# Patient Record
Sex: MALE | Race: BLACK OR AFRICAN AMERICAN | Employment: FULL TIME | URBAN - METROPOLITAN AREA
[De-identification: names, ages, dates, MRNs, and addresses within clinical notes are randomized per-mention and may not be internally consistent; named-entity substitution may affect disease eponyms.]

---

## 2020-07-07 ENCOUNTER — VIRTUAL VISIT (OUTPATIENT)
Dept: ENDOCRINOLOGY | Age: 35
End: 2020-07-07
Payer: COMMERCIAL

## 2020-07-07 PROCEDURE — 99203 OFFICE O/P NEW LOW 30 MIN: CPT | Performed by: INTERNAL MEDICINE

## 2020-07-07 RX ORDER — PEN NEEDLE, DIABETIC 31 GX5/16"
NEEDLE, DISPOSABLE MISCELLANEOUS
Qty: 150 EACH | Refills: 6 | Status: SHIPPED | OUTPATIENT
Start: 2020-07-07 | End: 2021-09-16

## 2020-07-07 RX ORDER — INSULIN DETEMIR 100 [IU]/ML
INJECTION, SOLUTION SUBCUTANEOUS
COMMUNITY
Start: 2020-05-15 | End: 2021-04-29 | Stop reason: SDUPTHER

## 2020-07-07 RX ORDER — INSULIN DETEMIR 100 [IU]/ML
INJECTION, SOLUTION SUBCUTANEOUS
Qty: 5 PEN | Refills: 1 | Status: SHIPPED | OUTPATIENT
Start: 2020-07-07 | End: 2021-02-01

## 2020-07-07 RX ORDER — INSULIN ASPART 100 [IU]/ML
INJECTION, SOLUTION INTRAVENOUS; SUBCUTANEOUS
Qty: 5 PEN | Refills: 3 | Status: SHIPPED | OUTPATIENT
Start: 2020-07-07 | End: 2021-09-16

## 2020-07-07 NOTE — PROGRESS NOTES
Seen as new patient for diabetes      Pursuant to the emergency declaration under the [facility], 1135 waiver authority and the [employer] Act, this Virtual  Visit was conducted, with patient's consent, to reduce the patient's risk of exposure to COVID-19 and provide continuity of care for an established patient. Patient was at home. Provider was at home. No one else was involved  Services were provided through a video synchronous discussion virtually to substitute for in-person clinic visit. Diagnosed with Type 1 diabetes mellitus 2017  Known diabetic complications: none   Uncontrolled, moderate    Glucose 622 on admission  Mild DKA per records/labs    Records reviewed    He was being seen at [facility], not adherent with visit  Now insurance changed    Current diabetic medications     Ran out of levemir insulin for a month    levemir  15 units  Humalog 8 units AC TID- last took 3 months ago    Last A1c   8.3%    Prior visit with dietician:no   Current diet: on average, 2 meals per day   Current exercise: walking   Current monitoring regimen: home blood tests -0 times daily     Has brought blood glucose log/meter: No   Home blood sugar records: -----  Any episodes of hypoglycemia? -----  Worsened by high CHO    No Hx of CAD , PVD, CVA     on 2/19      Last eye exam:   Last foot exam:   Last microalbumin to creatinine ratio:2/19 1/17  C-peptide 0.3  VAUGHN Ab 5.2   ICA -ve    He has been on insulin but now not taking    SH: No smoking, works at [employer]    [facility]: Type 1 and 2 DM in family    PMH:  T1DM    PSH:  None    ROS:    Constitutional: Negative for weight loss and malaise/fatigue. Negative for fever and chills. HENT: Negative for hearing loss, ear pain, nosebleeds, neck pain and tinnitus. Eyes: Negative for blurred vision. Negative for double vision, photophobia and pain.    Respiratory: Negative for cough and sputum production. Cardiovascular: Negative for chest pain, palpitations and leg swelling. Gastrointestinal: Negative for nausea, vomiting and abdominal pain. Genitourinary: Negative for dysuria, urgency and frequency. Musculoskeletal: Negative for back pain. No joint pain  Skin: Negative for itching and rash. Neurological: Negative for dizziness. Negative for tingling, tremors, focal weakness and headaches. Endo/Heme/Allergies: see HPI  Psychiatric/Behavioral: Negative for depression and substance abuse. PHYSICAL EXAMINATION:  [ INSTRUCTIONS:  \"[x]\" Indicates a positive item  \"[]\" Indicates a negative item  -- DELETE ALL ITEMS NOT EXAMINED]  Vital Signs: (As obtained by patient/caregiver or practitioner observation)    Blood pressure-  Heart rate-    Respiratory rate- 14   Temperature-  Pulse oximetry-     Constitutional Appears well-developed and well-nourished No apparent distress        Mental status  Alert and awake  Oriented to person/place/time  Able to follow commands      Eyes:  EOM    [x]  Normal    Sclera  [x]  Normal           Discharge [x]  None visible      HENT:   [x] Normocephalic, atraumatic.     [x] Mouth/Throat: Mucous membranes are moist.     External Ears [x] Normal  no discharge    Neck: [x] No visualized mass  no swelling    Pulmonary/Chest: [x] Respiratory effort normal.  [x] No visualized signs of difficulty breathing or respiratory distress             Musculoskeletal:   [x] Normal gait with no signs of ataxia         [x] Normal range of motion of neck          Head and neck stable, appears normal ROM, strength good    Neurological:        [x] No Facial Asymmetry (Cranial nerve 7 motor function) (limited exam to video visit)          [x] No gaze palsy                Skin:        [x] No significant exanthematous lesions or discoloration noted on facial skin                 Psychiatric:       [x] Normal Affect [x] No Hallucinations            Other pertinent observable

## 2021-04-28 ENCOUNTER — HOSPITAL ENCOUNTER (EMERGENCY)
Age: 36
Discharge: HOME | End: 2021-04-29
Attending: EMERGENCY MEDICINE
Payer: COMMERCIAL

## 2021-04-28 VITALS
BODY MASS INDEX: 27.68 KG/M2 | RESPIRATION RATE: 12 BRPM | DIASTOLIC BLOOD PRESSURE: 90 MMHG | OXYGEN SATURATION: 96 % | SYSTOLIC BLOOD PRESSURE: 125 MMHG | HEART RATE: 83 BPM | WEIGHT: 146.61 LBS | TEMPERATURE: 98.2 F | HEIGHT: 61 IN

## 2021-04-28 DIAGNOSIS — N48.1 BALANITIS: Primary | ICD-10-CM

## 2021-04-28 PROCEDURE — 87591 N.GONORRHOEAE DNA AMP PROB: CPT

## 2021-04-28 PROCEDURE — 99282 EMERGENCY DEPT VISIT SF MDM: CPT

## 2021-04-28 PROCEDURE — 87491 CHLMYD TRACH DNA AMP PROBE: CPT

## 2021-04-28 PROCEDURE — 81015 MICROSCOPIC EXAM OF URINE: CPT

## 2021-04-29 LAB
C. TRACHOMATIS DNA ,URINE: NEGATIVE
N. GONORRHOEAE DNA, URINE: NEGATIVE
URINE TRICHOMONAS EVALUATION: NORMAL

## 2021-04-29 RX ORDER — CLOTRIMAZOLE 1 %
CREAM (GRAM) TOPICAL
Qty: 1 TUBE | Refills: 1 | Status: SHIPPED | OUTPATIENT
Start: 2021-04-29 | End: 2021-05-06

## 2021-04-29 NOTE — ED PROVIDER NOTES
[address] ENCOUNTER      Pt Name: Yamel Cazares  MRN: [id number]  [place] 1985  Date of evaluation: 4/28/2021  Provider: Sudha Jenkins MD    CHIEF COMPLAINT       Chief Complaint   Patient presents with    Penile Discharge     with itching x3days         HISTORY OF PRESENT ILLNESS   (Location/Symptom, Timing/Onset,Context/Setting, Quality, Duration, Modifying Factors, Severity)  Note limiting factors. Yamel Cazares is a 38 y.o. male who presents to the emergency department for penile discharge and itching. The patient said it started about 3 days ago after having intercourse. He was wearing a condom. He has been having similar issues for about 4 years. It was associated with his diagnosis of diabetes. He said that he has been tested for STDs in the past and it was negative. He has not followed up. He is uncircumcised. The drainage is he describes it not from the urethra but beneath the foreskin. Nursing notes were reviewed. REVIEW OF SYSTEMS    (2-9 systems for level 4, 10 or more for level 5)     Review of Systems      PAST MEDICAL HISTORY     Past Medical History:   Diagnosis Date    Influenza A 12/22/2014         SURGICALHISTORY     No past surgical history on file. CURRENT MEDICATIONS       Discharge Medication List as of 4/29/2021 12:31 AM      CONTINUE these medications which have NOT CHANGED    Details   !! insulin detemir (LEVEMIR FLEXTOUCH) 100 UNIT/ML injection pen INJECT 15 UNITS DAILY, Disp-5 pen, R-0Needs to schedule f/uNormal      !!  LEVEMIR FLEXTOUCH 100 UNIT/ML injection pen INJECT 15 UNITS SUBCUTANEOUSLY AT BEDTIME FOR 30 DAYS., DAWHistorical Med      insulin aspart (NOVOLOG FLEXPEN) 100 UNIT/ML injection pen 8 units AC TID, Disp-5 pen, R-3Normal      Insulin Pen Needle (B-D UF III MINI PEN NEEDLES) 31G X 5 MM MISC Disp-150 each, R-6, Normal4 times a day      ibuprofen (ADVIL;MOTRIN) 200 MG tablet Take 600 mg by mouth every 6 hours as needed for Pain. !! - Potential duplicate medications found. Please discuss with provider. ALLERGIES     Patient has no known allergies. FAMILY HISTORY     No family history on file. SOCIAL HISTORY       Social History     Socioeconomic History    Marital status: Single     Spouse name: Not on file    Number of children: Not on file    Years of education: Not on file    Highest education level: Not on file   Occupational History    Not on file   Social Needs    Financial resource strain: Not on file    Food insecurity     Worry: Not on file     Inability: Not on file    Transportation needs     Medical: Not on file     Non-medical: Not on file   Tobacco Use    Smoking status: Never Smoker   Substance and Sexual Activity    Alcohol use: Yes    Drug use: Not on file    Sexual activity: Not on file   Lifestyle    Physical activity     Days per week: Not on file     Minutes per session: Not on file    Stress: Not on file   Relationships    Social connections     Talks on phone: Not on file     Gets together: Not on file     Attends [employer] service: Not on file     Active member of club or organization: Not on file     Attends meetings of clubs or organizations: Not on file     Relationship status: Not on file    Intimate partner violence     Fear of current or ex partner: Not on file     Emotionally abused: Not on file     Physically abused: Not on file     Forced sexual activity: Not on file   Other Topics Concern    Not on file   Social History Narrative    Not on file       SCREENINGS             PHYSICAL EXAM    (up to 7 for level 4, 8 or more for level 5)     ED Triage Vitals [04/28/21 2308]   BP Temp Temp Source Pulse Resp SpO2 Height Weight   (!) 125/90 98.2 °F (36.8 °C) Oral 83 12 96 % 5' 1\" (1.549 m) 146 lb 9.7 oz (66.5 kg)       Physical Exam  Vitals signs and nursing note reviewed. Constitutional:       Appearance: Normal appearance. He is well-developed. He is not ill-appearing. HENT:      Head: Normocephalic and atraumatic. Right Ear: External ear normal.      Left Ear: External ear normal.      Nose: Nose normal.   Eyes:      General: No scleral icterus. Right eye: No discharge. Left eye: No discharge. Conjunctiva/sclera: Conjunctivae normal.   Neck:      Musculoskeletal: Neck supple. Pulmonary:      Effort: Pulmonary effort is normal. No respiratory distress. Genitourinary:     Comments: No inguinal adenopathy or tenderness. No penile tenderness. He does have an uncircumcised penis. No urethral discharge however there is copious amount of creamish discharge beneath the foreskin  Skin:     Coloration: Skin is not pale. Neurological:      Mental Status: He is alert. Psychiatric:         Mood and Affect: Mood normal.         Behavior: Behavior normal.             DIAGNOSTIC RESULTS     EKG: All EKG's are interpreted by the Emergency Department Physician who either signs or Co-signs this chart in the absence of a cardiologist.    12 lead EKG shows     RADIOLOGY:   Non-plain film images such as CT, Ultrasound and MRI are read by the radiologist. Plain radiographic images are visualized and preliminarily interpreted by the emergency physician with the below findings:        Interpretation per the Radiologist below, if available at the time of this note:    No orders to display         ED BEDSIDE ULTRASOUND:   Performed by ED Physician - none    LABS:  Labs Reviewed   [address] DNA, URINE   URINE TRICHOMONAS EVALUATION    Narrative:     Performed at:  [facility]) - [facility]  [address] [facility]   Phone [phone]       All other labs were within normal range or not returned as of this dictation.     EMERGENCY DEPARTMENT COURSE and DIFFERENTIAL DIAGNOSIS/MDM:   Vitals:    Vitals:    04/28/21 2308   BP: (!) 125/90   Pulse: 83   Resp: 12 Temp: 98.2 °F (36.8 °C)   TempSrc: Oral   SpO2: 96%   Weight: 146 lb 9.7 oz (66.5 kg)   Height: 5' 1\" (1.549 m)       Adult male with what appears to be balanitis. Could be related to irritation from condom use as he said is usually associated with this but he is diabetic and an increased risk of yeast.  A KOH was ordered there were epithelial cells bacterial cells and no identified the yeast cells however given the patient's history I will prescribe antifungal.  I recommend close follow-up with a urologist.  His blood sugars have been controlled with medications. CRITICAL CARE TIME   None       CONSULTS:  None    PROCEDURES:       Procedures    FINAL IMPRESSION      1.  Balanitis          DISPOSITION/PLAN   DISPOSITION Decision To Discharge 04/29/2021 12:20:29 AM      PATIENT REFERREDTO:  Ifeoma Krishna, [address] [place] [address] [address] [place] [address]  [phone]            DISCHARGEMEDICATIONS:  Discharge Medication List as of 4/29/2021 12:31 AM      START taking these medications    Details   clotrimazole (LOTRIMIN) 1 % cream Apply topically 3 times daily to affected area, Disp-1 Tube, R-1, Normal                (Please note that portions of this note were completed with a voice recognition program.  Efforts were made to edit the dictations but occasionally words are mis-transcribed.)    Donis Zuleta MD (electronically signed)  Attending Emergency Physician        Donis Zuleta MD  04/29/21 [id number]

## 2021-09-09 RX ORDER — INSULIN ASPART 100 [IU]/ML
INJECTION, SOLUTION INTRAVENOUS; SUBCUTANEOUS
Qty: 5 PEN | Refills: 3 | OUTPATIENT
Start: 2021-09-09

## 2021-09-09 NOTE — TELEPHONE ENCOUNTER
Medication:   Requested Prescriptions     Pending Prescriptions Disp Refills    insulin aspart (NOVOLOG FLEXPEN) 100 UNIT/ML injection pen [Pharmacy Med Name: Josr Morocho 100 UNIT/ML FLEXPEN] 5 pen 3     Si UNITS THREE TIMES DAILY BEFORE MEALS       Last Filled:      Patient Phone Number: [phone] (home)     Last appt: 2020   Next appt: Visit date not found    Last Labs DM: No results found for: Christopher Thomas

## 2021-09-16 ENCOUNTER — HOSPITAL ENCOUNTER (EMERGENCY)
Age: 36
Discharge: HOME | End: 2021-09-16
Attending: EMERGENCY MEDICINE
Payer: COMMERCIAL

## 2021-09-16 VITALS
TEMPERATURE: 97.8 F | HEART RATE: 93 BPM | OXYGEN SATURATION: 97 % | WEIGHT: 143.08 LBS | RESPIRATION RATE: 18 BRPM | BODY MASS INDEX: 27.03 KG/M2 | DIASTOLIC BLOOD PRESSURE: 85 MMHG | SYSTOLIC BLOOD PRESSURE: 133 MMHG

## 2021-09-16 DIAGNOSIS — R73.9 HYPERGLYCEMIA: ICD-10-CM

## 2021-09-16 DIAGNOSIS — Z76.0 ENCOUNTER FOR MEDICATION REFILL: Primary | ICD-10-CM

## 2021-09-16 DIAGNOSIS — Z79.4 INSULIN LONG-TERM USE (HCC): ICD-10-CM

## 2021-09-16 DIAGNOSIS — E13.9 LADA (LATENT AUTOIMMUNE DIABETES IN ADULTS), MANAGED AS TYPE 1 (HCC): Primary | ICD-10-CM

## 2021-09-16 DIAGNOSIS — E11.9 DIABETES MELLITUS TYPE 2 IN NONOBESE (HCC): ICD-10-CM

## 2021-09-16 PROCEDURE — 99283 EMERGENCY DEPT VISIT LOW MDM: CPT

## 2021-09-16 RX ORDER — INSULIN ASPART 100 [IU]/ML
INJECTION, SOLUTION INTRAVENOUS; SUBCUTANEOUS
Qty: 5 PEN | Refills: 3 | Status: SHIPPED | OUTPATIENT
Start: 2021-09-16 | End: 2022-04-14 | Stop reason: SDUPTHER

## 2021-09-16 RX ORDER — INSULIN DETEMIR 100 [IU]/ML
INJECTION, SOLUTION SUBCUTANEOUS
Qty: 5 PEN | Refills: 3 | Status: SHIPPED | OUTPATIENT
Start: 2021-09-16 | End: 2022-04-21 | Stop reason: SDUPTHER

## 2021-09-16 RX ORDER — PEN NEEDLE, DIABETIC 31 GX5/16"
NEEDLE, DISPOSABLE MISCELLANEOUS
Qty: 100 EACH | Refills: 10 | Status: SHIPPED | OUTPATIENT
Start: 2021-09-16

## 2021-09-16 ASSESSMENT — ENCOUNTER SYMPTOMS
GASTROINTESTINAL NEGATIVE: 1
EYES NEGATIVE: 1
COUGH: 0

## 2021-09-16 NOTE — TELEPHONE ENCOUNTER
Medication:   Requested Prescriptions     Pending Prescriptions Disp Refills    insulin aspart (NOVOLOG FLEXPEN) 100 UNIT/ML injection pen [Pharmacy Med Name: Aimee Land 100 UNIT/ML FLEXPEN] 5 pen 3     Si UNITS THREE TIMES DAILY BEFORE MEALS    Insulin Pen Needle (B-D UF III MINI PEN NEEDLES) 31G X 5 MM MISC [Pharmacy Med Name: BD UF MINI PEN NEEDLE [id number]] 100 each 10     Sig: USE 4 TIMES A DAY         Last appt: 2020   Next appt: 2021

## 2021-09-16 NOTE — ED PROVIDER NOTES
file.  CURRENT MEDICATIONS       Current Discharge Medication List      CONTINUE these medications which have NOT CHANGED    Details   insulin aspart (NOVOLOG FLEXPEN) 100 UNIT/ML injection pen 8 units AC TID  Qty: 5 pen, Refills: 3      Insulin Pen Needle (B-D UF III MINI PEN NEEDLES) 31G X 5 MM MISC 4 times a day  Qty: 150 each, Refills: 6      ibuprofen (ADVIL;MOTRIN) 200 MG tablet Take 600 mg by mouth every 6 hours as needed for Pain. ALLERGIES     Patient has no known allergies. FAMILY HISTORY     No family history on file. SOCIAL HISTORY       Social History     Socioeconomic History    Marital status: Single     Spouse name: Not on file    Number of children: Not on file    Years of education: Not on file    Highest education level: Not on file   Occupational History    Not on file   Tobacco Use    Smoking status: Never Smoker   Substance and Sexual Activity    Alcohol use: Yes     Comment: 0cc    Drug use: Not on file    Sexual activity: Not on file   Other Topics Concern    Not on file   Social History Narrative    Not on file     Social Determinants of Health     Financial Resource Strain:     Difficulty of Paying Living Expenses:    Food Insecurity:     Worried About Running Out of Food in the Last Year:     [address] in the Last Year:    Transportation Needs:     Lack of Transportation (Medical):      Lack of Transportation (Non-Medical):    Physical Activity:     Days of Exercise per Week:     Minutes of Exercise per Session:    Stress:     Feeling of Stress :    Social Connections:     Frequency of Communication with Friends and Family:     Frequency of Social Gatherings with Friends and Family:     Attends Jainism Services:     Active Member of Clubs or Organizations:     Attends Club or Organization Meetings:     Marital Status:    Intimate Partner Violence:     Fear of Current or Ex-Partner:     Emotionally Abused:     Physically Abused:     Sexually Abused:      SCREENINGS         PHYSICAL EXAM  (up to 7 for level 4, 8 or more for level 5)   INITIAL VITALS: BP: 133/85, Temp: 97.8 °F (36.6 °C), Pulse: 93, Resp: 18, SpO2: 97 %   Physical Exam  Vitals reviewed. Constitutional:       General: He is not in acute distress. Appearance: He is not ill-appearing, toxic-appearing or diaphoretic. HENT:      Head: Normocephalic and atraumatic. Right Ear: External ear normal.      Left Ear: External ear normal.      Nose: Nose normal.   Eyes:      General: No scleral icterus. Right eye: No discharge. Left eye: No discharge. Conjunctiva/sclera: Conjunctivae normal.   Neck:      Trachea: No tracheal deviation. Cardiovascular:      Rate and Rhythm: Normal rate. Pulses: Normal pulses. Pulmonary:      Effort: Pulmonary effort is normal. No respiratory distress. Abdominal:      Tenderness: There is no abdominal tenderness. There is no guarding or rebound. Musculoskeletal:      Cervical back: No rigidity. Right lower leg: No edema. Left lower leg: No edema. Skin:     General: Skin is dry. Capillary Refill: Capillary refill takes less than 2 seconds. Neurological:      General: No focal deficit present. Mental Status: He is alert and oriented to person, place, and time.    Psychiatric:         Mood and Affect: Mood normal.         Behavior: Behavior normal.       DIAGNOSTIC RESULTS     RADIOLOGY:   Interpretation per Radiologist below, if available at the time of this note:  No orders to display     LABS:  Labs Reviewed - No data to display    [address] [place] [address] and DIFFERENTIAL DIAGNOSIS/MDM:   Patient was given the following medications:  Orders Placed This Encounter   Medications    insulin detemir (LEVEMIR FLEXTOUCH) 100 UNIT/ML injection pen     Sig: INJECT 15 UNITS SUBCUTANEOUSLY DAILY (100 DAY SUPPLY)     Dispense:  5 pen     Refill:  3     CONSULTS:  None    INITIAL VITALS: BP: 133/85, Temp: 97.8 °F (36.6 °C), Pulse: 93, Resp: 18, SpO2: 97 %   RECENT VITALS:  BP: 133/85,Temp: 97.8 °F (36.6 °C), Pulse: 93, Resp: 18, SpO2: 97 %     Tiffany Arias is a 38 y.o. male who presents to the emergency department secondary to concern for symptoms as noted in HPI. On arrival he is awake, alert, oriented. Vitals are hemodynamically stable. During my exam I asked him about checking a fingerstick however he declined stating he was checking his sugars at home and he did not feel he needed this test currently. The nurse also asked him and he again declined. Given he is awake, alert, oriented with hemodynamically stable vitals I do feel it is reasonable to not check it at this time though I talked to him about strict return precautions. I also told him it was better to come in to have the medication refilled rather than wait to see his primary care on October 12 and try to get by on the NovoLog. Review of the medical records shows he has seen Dr. Rose Romano, endocrinology, and had the other two insulin med refills done on 9.9.2021 - when I asked him why 2 out of 3 were refilled he told me he could not remember the endocrinologist name to reach back out to him. This information will also be provided in his discharge paperwork as was information for [employer] online. On medication review it appears he does have 3 refills of the NovoLog as well as 6 refills of the insulin pen needles so these were not refilled. He had 3 refills of the Levemir prescribed. He was discharged home in stable condition. FINAL IMPRESSION      1. Encounter for medication refill    2.  Diabetes mellitus type 2 in nonobese [facility])        DISPOSITION/PLAN   DISPOSITION Decision To Discharge 09/16/2021 08:10:58 AM      PATIENT REFERRED TO:  Primary Care    Go on 10/12/2021  Follow up appointment as scheduled      DISCHARGE MEDICATIONS:  Current Discharge Medication List               (Please note that portions of this note were completed with a voice recognition program. Efforts were made to edit the dictations but occasionally words are mis-transcribed.)    Haritha Rome MD (electronically signed)  Attending Emergency Physician        Haritha Rome MD  09/16/21 4360

## 2021-09-16 NOTE — ED NOTES
Pt states ran out of novolog insulin 2 days ago   Will be seeing PMD next month.      Kelsi Jose RN  09/16/21 [id number]

## 2021-09-16 NOTE — TELEPHONE ENCOUNTER
Patient needs a refill of his insulin aspart (NOVOLOG FLEXPEN) 100 UNIT/ML injection pen             [facility] [address] [phone]   [address] [place] [address]   Phone:  [phone]  Fax:  [phone]

## 2022-04-14 DIAGNOSIS — E13.9 LADA (LATENT AUTOIMMUNE DIABETES IN ADULTS), MANAGED AS TYPE 1 (HCC): ICD-10-CM

## 2022-04-14 DIAGNOSIS — Z79.4 INSULIN LONG-TERM USE (HCC): ICD-10-CM

## 2022-04-14 DIAGNOSIS — R73.9 HYPERGLYCEMIA: ICD-10-CM

## 2022-04-14 RX ORDER — INSULIN ASPART 100 [IU]/ML
INJECTION, SOLUTION INTRAVENOUS; SUBCUTANEOUS
Qty: 5 PEN | Refills: 0 | Status: SHIPPED | OUTPATIENT
Start: 2022-04-14 | End: 2022-04-21 | Stop reason: SDUPTHER

## 2022-04-14 NOTE — TELEPHONE ENCOUNTER
2 way call w/ pt and [facility] stating pt needs refill on his Novolog. Pt had not been seen since July 2020 , so instructed to pt he needs a follow up appt. Pt will need refill to go to local pharmacy since he's almost out or is out of insulin?  Pt did make FU appt    [id number]    7-7-20  FOV    4-21-22    CB# for pt [phone]

## 2022-04-21 ENCOUNTER — OFFICE VISIT (OUTPATIENT)
Dept: ENDOCRINOLOGY | Age: 37
End: 2022-04-21
Payer: COMMERCIAL

## 2022-04-21 VITALS
SYSTOLIC BLOOD PRESSURE: 98 MMHG | HEIGHT: 61 IN | BODY MASS INDEX: 27.98 KG/M2 | OXYGEN SATURATION: 97 % | WEIGHT: 148.2 LBS | HEART RATE: 92 BPM | DIASTOLIC BLOOD PRESSURE: 72 MMHG

## 2022-04-21 DIAGNOSIS — E13.9 LADA (LATENT AUTOIMMUNE DIABETES IN ADULTS), MANAGED AS TYPE 1 (HCC): ICD-10-CM

## 2022-04-21 DIAGNOSIS — R73.9 HYPERGLYCEMIA: ICD-10-CM

## 2022-04-21 DIAGNOSIS — Z79.4 INSULIN LONG-TERM USE (HCC): ICD-10-CM

## 2022-04-21 LAB
CREATININE URINE: 145.2 MG/DL (ref 39–259)
HBA1C MFR BLD: 14 %
MICROALBUMIN UR-MCNC: <1.2 MG/DL
MICROALBUMIN/CREAT UR-RTO: NORMAL MG/G (ref 0–30)

## 2022-04-21 PROCEDURE — 2022F DILAT RTA XM EVC RTNOPTHY: CPT | Performed by: INTERNAL MEDICINE

## 2022-04-21 PROCEDURE — 1036F TOBACCO NON-USER: CPT | Performed by: INTERNAL MEDICINE

## 2022-04-21 PROCEDURE — 99214 OFFICE O/P EST MOD 30 MIN: CPT | Performed by: INTERNAL MEDICINE

## 2022-04-21 PROCEDURE — 83036 HEMOGLOBIN GLYCOSYLATED A1C: CPT | Performed by: INTERNAL MEDICINE

## 2022-04-21 PROCEDURE — G8427 DOCREV CUR MEDS BY ELIG CLIN: HCPCS | Performed by: INTERNAL MEDICINE

## 2022-04-21 PROCEDURE — G8419 CALC BMI OUT NRM PARAM NOF/U: HCPCS | Performed by: INTERNAL MEDICINE

## 2022-04-21 PROCEDURE — 3046F HEMOGLOBIN A1C LEVEL >9.0%: CPT | Performed by: INTERNAL MEDICINE

## 2022-04-21 RX ORDER — LANCETS
1 EACH MISCELLANEOUS 4 TIMES DAILY
Qty: 100 EACH | Refills: 6 | Status: SHIPPED | OUTPATIENT
Start: 2022-04-21

## 2022-04-21 RX ORDER — BLOOD-GLUCOSE METER
EACH MISCELLANEOUS
Qty: 1 KIT | Refills: 0 | Status: SHIPPED | OUTPATIENT
Start: 2022-04-21

## 2022-04-21 RX ORDER — BLOOD SUGAR DIAGNOSTIC
STRIP MISCELLANEOUS
Qty: 100 EACH | Refills: 3 | Status: SHIPPED | OUTPATIENT
Start: 2022-04-21

## 2022-04-21 RX ORDER — INSULIN DETEMIR 100 [IU]/ML
INJECTION, SOLUTION SUBCUTANEOUS
Qty: 5 PEN | Refills: 3 | Status: SHIPPED | OUTPATIENT
Start: 2022-04-21 | End: 2022-10-25 | Stop reason: SDUPTHER

## 2022-04-21 RX ORDER — INSULIN ASPART 100 [IU]/ML
INJECTION, SOLUTION INTRAVENOUS; SUBCUTANEOUS
Qty: 5 PEN | Refills: 2 | Status: SHIPPED | OUTPATIENT
Start: 2022-04-21 | End: 2022-10-25 | Stop reason: SDUPTHER

## 2022-04-21 NOTE — PROGRESS NOTES
Seen as patient for diabetes    Interim:    Seen after 7/20  Does not have meter  Out of levemir    Diagnosed with Type 1 diabetes mellitus 2017  Known diabetic complications: none   Uncontrolled, moderate    Glucose 622 on admission  Mild DKA per records/labs    Records reviewed    He was being seen at [facility], not adherent with visit  Now insurance changed    Current diabetic medications     Ran out of levemir insulin for a month    levemir  15 units  Ran out a few weeks ago  Novolog  8 units AC TID-     Last A1c   8.3% --->   >14%    Prior visit with dietician:no   Current diet: on average, 2 meals per day   Current exercise: walking   Current monitoring regimen: home blood tests -Occ    Has brought blood glucose log/meter: No   Home blood sugar records:  Any episodes of hypoglycemia? -----  Worsened by high CHO    No Hx of CAD , PVD, CVA     on 2/19      Last eye exam:   Last foot exam:   Last microalbumin to creatinine ratio:2/19 1/17  C-peptide 0.3  VAUGHN Ab 5.2   ICA -ve    He has been on insulin but now not taking    SH: No smoking, works at [employer]    [facility]: Type 1 and 2 DM in family    PMH:  T1DM    PSH:  None    ROS:    Scanned, reviewed      PHYSICAL EXAMINATION:    Vitals:    04/21/22 1335   BP: 98/72   Pulse: 92   SpO2: 97%     Constitutional: Well-developed, appears stated age, cooperative, in no acute distress  H/E/N/M/T:atraumatic, normocephalic, external ears, nose, lips normal without lesions  Eyes: Lids, lashes, conjunctivae and sclerae normal, No proptosis, no redness  Neck: supple, symmetrical, no swelling  Skin: No obvious rashes or lesions present.   Skin and hair texture normal  Psychiatric: Judgement and Insight:  judgement and insight appear normal  Neuro: Normal without focal findings, speech is normal normal, speech is spontaneous  Chest: No labored breathing, no chest deformity, no stridor  Musculoskeletal: No joint deformity, swelling    4/22  Foot exam:  Monofilament detected, no ulcers    Lab Reviewed   No components found for: CHLPL  No results found for: TRIG  No results found for: HDL  No results found for: LDLCALC  No results found for: LABVLDL  No results found for: LABA1C    Assessment:     Faustina Dakin is a 43 y.o. male with : 1.DM: History and labs suggest HEATHER, managed as type 1. Discussed goals, risk of complications. He has been out of insulin. Advised risk of DKA and never to go without insulin. Restart basal . Discussed pen use. Advised self monitoring. May consider CGM, currently not interested  Needs eye exam, discussed. Lost to f/u, A1c very high, discussed concern and risk of uncontrolled DM    2. Insulin therapy: Discussed the importance of adherence, not to interrupt therapy or treatment. Plan:      Levemir 17 units   Novolog 9 units AC TID   SSI 1 for  50 > 150   Advised to check blood sugar 4 times a day   Advise to low simple carbohydrate and protein with each  meal diet. Diabetes Care: recommend yearly eye exam, foot exam and urine microalbumin to   creatinine ratio. Due   F/u in 3 months

## 2022-09-21 DIAGNOSIS — E13.9 LADA (LATENT AUTOIMMUNE DIABETES IN ADULTS), MANAGED AS TYPE 1 (HCC): ICD-10-CM

## 2022-09-21 DIAGNOSIS — Z79.4 INSULIN LONG-TERM USE (HCC): ICD-10-CM

## 2022-09-21 DIAGNOSIS — R73.9 HYPERGLYCEMIA: ICD-10-CM

## 2022-09-21 RX ORDER — INSULIN ASPART 100 [IU]/ML
INJECTION, SOLUTION INTRAVENOUS; SUBCUTANEOUS
Refills: 1 | OUTPATIENT
Start: 2022-09-21

## 2022-09-21 NOTE — TELEPHONE ENCOUNTER
Medication:   Requested Prescriptions     Pending Prescriptions Disp Refills    NOVOLOG FLEXPEN 100 UNIT/ML injection pen [Pharmacy Med Name: Man Nevarez 100 UNIT/ML FLEXPEN]  1     Si UNITS THREE TIMES DAILY BEFORE MEALS       Last Filled:      Patient Phone Number: [phone] (home)     Last appt: 2022   Next appt: Visit date not found    Last Labs DM:   Lab Results   Component Value Date/Time    LABA1C 14.0 2022 02:24 PM

## 2022-10-25 DIAGNOSIS — Z79.4 INSULIN LONG-TERM USE (HCC): ICD-10-CM

## 2022-10-25 DIAGNOSIS — E13.9 LADA (LATENT AUTOIMMUNE DIABETES IN ADULTS), MANAGED AS TYPE 1 (HCC): ICD-10-CM

## 2022-10-25 DIAGNOSIS — R73.9 HYPERGLYCEMIA: ICD-10-CM

## 2022-10-25 RX ORDER — INSULIN DETEMIR 100 [IU]/ML
INJECTION, SOLUTION SUBCUTANEOUS
Qty: 5 ADJUSTABLE DOSE PRE-FILLED PEN SYRINGE | Refills: 3 | Status: SHIPPED | OUTPATIENT
Start: 2022-10-25

## 2022-10-25 RX ORDER — INSULIN ASPART 100 [IU]/ML
INJECTION, SOLUTION INTRAVENOUS; SUBCUTANEOUS
Qty: 5 ADJUSTABLE DOSE PRE-FILLED PEN SYRINGE | Refills: 3 | Status: SHIPPED | OUTPATIENT
Start: 2022-10-25 | End: 2022-11-30

## 2022-10-25 NOTE — TELEPHONE ENCOUNTER
Pt requesting refill  insulin detemir (LEVEMIR FLEXTOUCH) 100 UNIT/ML injection pen     insulin aspart (NOVOLOG FLEXPEN) 100 UNIT/ML injection pen         [facility]  [address] [place] [address], [address] [place] [address],[address]  Phone: [phone]

## 2022-10-25 NOTE — TELEPHONE ENCOUNTER
Medication:   Requested Prescriptions     Pending Prescriptions Disp Refills    insulin aspart (NOVOLOG FLEXPEN) 100 UNIT/ML injection pen 5 Adjustable Dose Pre-filled Pen Syringe 3     Si UNITS THREE TIMES DAILY BEFORE MEALS    insulin detemir (LEVEMIR FLEXTOUCH) 100 UNIT/ML injection pen 5 Adjustable Dose Pre-filled Pen Syringe 3     Sig: INJECT 15 UNITS SUBCUTANEOUSLY DAILY (100 DAY SUPPLY)       Last Filled:      Patient Phone Number: [phone] (home)     Last appt:22   Next appt: 23    Last Labs DM:   Lab Results   Component Value Date/Time    LABA1C 14.0 2022 02:24 PM

## 2022-11-30 DIAGNOSIS — E13.9 LADA (LATENT AUTOIMMUNE DIABETES IN ADULTS), MANAGED AS TYPE 1 (HCC): ICD-10-CM

## 2022-11-30 DIAGNOSIS — Z79.4 INSULIN LONG-TERM USE (HCC): ICD-10-CM

## 2022-11-30 DIAGNOSIS — R73.9 HYPERGLYCEMIA: ICD-10-CM

## 2022-11-30 RX ORDER — INSULIN ASPART 100 [IU]/ML
INJECTION, SOLUTION INTRAVENOUS; SUBCUTANEOUS
Qty: 45 ML | Refills: 2 | Status: SHIPPED | OUTPATIENT
Start: 2022-11-30

## 2022-11-30 NOTE — TELEPHONE ENCOUNTER
Medication:   Requested Prescriptions     Pending Prescriptions Disp Refills    NOVOLOG FLEXPEN 100 UNIT/ML injection pen [Pharmacy Med Name: Supriya Self 100 UNIT/ML FLEXPEN] 45 mL 2     Si UNITS THREE TIMES DAILY BEFORE MEALS       Last Filled:      Patient Phone Number: [phone] (home)     Last appt: 22  Next appt: 23    Last Labs DM:   Lab Results   Component Value Date/Time    LABA1C 14.0 2022 02:24 PM

## 2023-02-01 ENCOUNTER — OFFICE VISIT (OUTPATIENT)
Dept: ENDOCRINOLOGY | Age: 38
End: 2023-02-01
Payer: COMMERCIAL

## 2023-02-01 VITALS
WEIGHT: 156 LBS | RESPIRATION RATE: 14 BRPM | DIASTOLIC BLOOD PRESSURE: 83 MMHG | TEMPERATURE: 98 F | BODY MASS INDEX: 29.45 KG/M2 | HEART RATE: 97 BPM | HEIGHT: 61 IN | SYSTOLIC BLOOD PRESSURE: 127 MMHG

## 2023-02-01 DIAGNOSIS — E13.9 LADA (LATENT AUTOIMMUNE DIABETES IN ADULTS), MANAGED AS TYPE 1 (HCC): ICD-10-CM

## 2023-02-01 DIAGNOSIS — R73.9 HYPERGLYCEMIA: ICD-10-CM

## 2023-02-01 DIAGNOSIS — Z79.4 INSULIN LONG-TERM USE (HCC): ICD-10-CM

## 2023-02-01 LAB — HBA1C MFR BLD: 10.5 %

## 2023-02-01 PROCEDURE — 99214 OFFICE O/P EST MOD 30 MIN: CPT | Performed by: INTERNAL MEDICINE

## 2023-02-01 PROCEDURE — 83036 HEMOGLOBIN GLYCOSYLATED A1C: CPT | Performed by: INTERNAL MEDICINE

## 2023-02-01 RX ORDER — CALCIUM CITRATE/VITAMIN D3 200MG-6.25
1 TABLET ORAL 3 TIMES DAILY
Qty: 100 EACH | Refills: 3 | Status: SHIPPED | OUTPATIENT
Start: 2023-02-01

## 2023-02-01 RX ORDER — INSULIN DETEMIR 100 [IU]/ML
INJECTION, SOLUTION SUBCUTANEOUS
Qty: 15 ML | Refills: 3 | Status: SHIPPED | OUTPATIENT
Start: 2023-02-01

## 2023-02-01 RX ORDER — PEN NEEDLE, DIABETIC 31 GX5/16"
NEEDLE, DISPOSABLE MISCELLANEOUS
Qty: 150 EACH | Refills: 5 | Status: SHIPPED | OUTPATIENT
Start: 2023-02-01

## 2023-02-01 RX ORDER — UBIQUINOL 100 MG
CAPSULE ORAL
Qty: 100 EACH | Refills: 5 | Status: SHIPPED | OUTPATIENT
Start: 2023-02-01

## 2023-02-01 RX ORDER — INSULIN ASPART 100 [IU]/ML
INJECTION, SOLUTION INTRAVENOUS; SUBCUTANEOUS
Qty: 45 ML | Refills: 3 | Status: SHIPPED | OUTPATIENT
Start: 2023-02-01

## 2023-02-01 RX ORDER — LANCETS 30 GAUGE
EACH MISCELLANEOUS
Qty: 100 EACH | Refills: 5 | Status: SHIPPED | OUTPATIENT
Start: 2023-02-01

## 2023-02-01 NOTE — PROGRESS NOTES
Seen as patient for diabetes    Interim:    Seen after 4/22  Has meter    Diagnosed with Type 1 diabetes mellitus 2017  Known diabetic complications: none   Uncontrolled, moderate    Glucose 622 on admission  Mild DKA per records/labs    Records reviewed    He was being seen at [facility], not adherent with visit  Now insurance changed    Current diabetic medications     levemir  15 units    Novolog  10 units AC TID-     Last A1c   8.3% --->   >14%---> 10.5%    Relative hypoglycemia in 100s    Prior visit with dietician:no   Current diet: on average, 2 meals per day   Current exercise: walking   Current monitoring regimen: home blood tests -Occ    Has brought blood glucose log/meter: yes  Home blood sugar records:one glucose over last 3 months  Any episodes of hypoglycemia? -----  Worsened by high CHO    No Hx of CAD , PVD, CVA     on 2/19      Last eye exam: 2022  Last foot exam: 4/22  Last microalbumin to creatinine ratio:4/22 1/17  C-peptide 0.3  VAUGHN Ab 5.2   ICA -ve      SH: No smoking, works at [employer]    [facility]: Type 1 and 2 DM in family    PMH:  T1DM    PSH:  None    ROS:    Scanned, reviewed      PHYSICAL EXAMINATION:    Vitals:    02/01/23 1403   BP: 127/83   Pulse: 97   Resp: 14   Temp: 98 °F (36.7 °C)     Constitutional: Well-developed, appears stated age, cooperative, in no acute distress  H/E/N/M/T:atraumatic, normocephalic, external ears, nose, lips normal without lesions  Eyes: Lids, lashes, conjunctivae and sclerae normal, No proptosis, no redness  Neck: supple, symmetrical, no swelling  Skin: No obvious rashes or lesions present.   Skin and hair texture normal  Psychiatric: Judgement and Insight:  judgement and insight appear normal  Neuro: Normal without focal findings, speech is normal normal, speech is spontaneous  Chest: No labored breathing, no chest deformity, no stridor  Musculoskeletal: No joint deformity, swelling    4/22  Foot exam:  Monofilament detected, no ulcers    Lab Reviewed   No components found for: CHLPL  No results found for: TRIG  No results found for: HDL  No results found for: LDLCALC  No results found for: LABVLDL  Lab Results   Component Value Date    LABA1C 14.0 04/21/2022       Assessment:     Marty Mast is a 42 y.o. male with : 1.DM: History and labs suggest HEATHER, managed as type 1. Discussed goals, risk of complications. Advised self monitoring. May consider CGM, currently not interested  Lost to f/u, A1c very high, discussed concern and risk of uncontrolled DM  Adjust insulin dose, will change gradually given relative hypoglycemia. 2.Insulin therapy: Discussed the importance of adherence, not to interrupt therapy or treatment. Plan:      Levemir 18 units   Novolog 11 units AC TID   SSI 1 for  50 > 150   Advised to check blood sugar 4 times a day   Advise to low simple carbohydrate and protein with each  meal diet. Diabetes Care: recommend yearly eye exam, foot exam and urine microalbumin to   creatinine ratio. Due   F/u in 3 months

## 2023-06-07 DIAGNOSIS — Z79.4 INSULIN LONG-TERM USE (HCC): ICD-10-CM

## 2023-06-07 DIAGNOSIS — E13.9 LADA (LATENT AUTOIMMUNE DIABETES IN ADULTS), MANAGED AS TYPE 1 (HCC): ICD-10-CM

## 2023-06-08 RX ORDER — CALCIUM CITRATE/VITAMIN D3 200MG-6.25
TABLET ORAL
Qty: 100 STRIP | Refills: 3 | Status: SHIPPED | OUTPATIENT
Start: 2023-06-08

## 2023-06-08 NOTE — TELEPHONE ENCOUNTER
Medication:   Requested Prescriptions     Pending Prescriptions Disp Refills    TRUE METRIX BLOOD GLUCOSE TEST strip [Pharmacy Med Name: TRUE METRIX GLUCOSE TEST STRIP] 100 strip 3     Sig: USE 1 EACH BY IN VITRO ROUTE 3 TIMES DAILY AS NEEDED.        Last Filled:      Patient Phone Number: [phone] (home)     Last appt: 2/1/2023   Next appt: Visit date not found    Last Labs DM:   Lab Results   Component Value Date/Time    LABA1C 10.5 02/01/2023 02:36 PM

## 2023-07-03 ENCOUNTER — TELEPHONE (OUTPATIENT)
Dept: FAMILY MEDICINE CLINIC | Age: 38
End: 2023-07-03

## 2023-07-03 NOTE — TELEPHONE ENCOUNTER
Patient dropped off a form for Dr. Krish Reeves to fill out & sign for his new job    Form is in Dr. Kervin Salazar folder    Please call when completed     Please Advise

## 2023-07-06 NOTE — TELEPHONE ENCOUNTER
Lvm for pt per Dr Joann James,     Pt needs recent appt within last 3 mo, must schedule vv is okay, also needs to bring in 3 mo worth of sugar readings

## 2024-05-29 ENCOUNTER — TELEPHONE (OUTPATIENT)
Dept: ENDOCRINOLOGY | Age: 39
End: 2024-05-29

## 2024-05-29 DIAGNOSIS — R73.9 HYPERGLYCEMIA: ICD-10-CM

## 2024-05-29 DIAGNOSIS — Z79.4 INSULIN LONG-TERM USE (HCC): ICD-10-CM

## 2024-05-29 DIAGNOSIS — E13.9 LADA (LATENT AUTOIMMUNE DIABETES IN ADULTS), MANAGED AS TYPE 1 (HCC): ICD-10-CM

## 2024-05-29 RX ORDER — INSULIN ASPART 100 [IU]/ML
INJECTION, SOLUTION INTRAVENOUS; SUBCUTANEOUS
Qty: 45 ML | Refills: 3 | Status: SHIPPED | OUTPATIENT
Start: 2024-05-29

## 2024-05-29 NOTE — TELEPHONE ENCOUNTER
Patient called requesting a refill, pt stated that he is completely out of her medication    Rx- insulin detemir (LEVEMIR FLEXTOUCH) 100 UNIT/ML injection pen    insulin aspart (NOVOLOG FLEXPEN) 100 UNIT/ML injection pen    Pharmacy- [facility] [address]     LOV-  NOV- 8/9/24    Please advise

## 2024-06-04 ENCOUNTER — TELEPHONE (OUTPATIENT)
Dept: ENDOCRINOLOGY | Age: 39
End: 2024-06-04

## 2024-06-04 RX ORDER — INSULIN GLARGINE 100 [IU]/ML
INJECTION, SOLUTION SUBCUTANEOUS
Qty: 5 ADJUSTABLE DOSE PRE-FILLED PEN SYRINGE | Refills: 0 | Status: SHIPPED | OUTPATIENT
Start: 2024-06-04

## 2024-08-09 ENCOUNTER — TELEMEDICINE (OUTPATIENT)
Dept: ENDOCRINOLOGY | Age: 39
End: 2024-08-09

## 2024-08-09 DIAGNOSIS — R73.9 HYPERGLYCEMIA: ICD-10-CM

## 2024-08-09 DIAGNOSIS — Z79.4 INSULIN LONG-TERM USE (HCC): ICD-10-CM

## 2024-08-09 DIAGNOSIS — E13.9 LADA (LATENT AUTOIMMUNE DIABETES IN ADULTS), MANAGED AS TYPE 1 (HCC): ICD-10-CM

## 2024-08-09 RX ORDER — BLOOD SUGAR DIAGNOSTIC
STRIP MISCELLANEOUS
Qty: 100 EACH | Refills: 3 | Status: SHIPPED | OUTPATIENT
Start: 2024-08-09

## 2024-08-09 RX ORDER — LANCETS
1 EACH MISCELLANEOUS
Qty: 100 EACH | Refills: 6 | Status: SHIPPED | OUTPATIENT
Start: 2024-08-09

## 2024-08-09 RX ORDER — BLOOD-GLUCOSE METER
EACH MISCELLANEOUS
Qty: 1 KIT | Refills: 0 | Status: SHIPPED | OUTPATIENT
Start: 2024-08-09

## 2024-08-09 RX ORDER — INSULIN GLARGINE 100 [IU]/ML
INJECTION, SOLUTION SUBCUTANEOUS
Qty: 5 ADJUSTABLE DOSE PRE-FILLED PEN SYRINGE | Refills: 0 | Status: SHIPPED | OUTPATIENT
Start: 2024-08-09

## 2024-08-09 RX ORDER — INSULIN ASPART 100 [IU]/ML
INJECTION, SOLUTION INTRAVENOUS; SUBCUTANEOUS
Qty: 45 ML | Refills: 3 | Status: SHIPPED | OUTPATIENT
Start: 2024-08-09

## 2024-08-09 NOTE — PROGRESS NOTES
Seen as patient for diabetes      Patient was evaluated through a synchronous (real-time) audio-video encounter. The patient (or guardian if applicable) is aware that this is a billable service, which includes applicable co-pays. This Virtual Visit was conducted with patient's (and/or legal guardian's) consent. Patient identification was verified, and a caregiver was present when appropriate.   [place]:   Provider [place]    STOP! Confirm you are appropriately licensed, registered, or certified to deliver care in the state where the patient is located as indicated above. If you are not or unsure, please re-schedule the visit.    Are you appropriately licensed in the patient's state?: Yes      Interim:    Seen after 2/23  Lost meter    Diagnosed with Type 1 diabetes mellitus 2017  Known diabetic complications: none   Uncontrolled, moderate    Glucose 622 on admission  Mild DKA per records/labs    Records reviewed    He was being seen at , not adherent with visit  Now insurance changed    Current diabetic medications     Lantus  15 units    Novolog  12 units AC TID-     Last A1c   8.3% --->   >14%---> 10.5%    Relative hypoglycemia in 100s    Prior visit with dietician:no   Current diet: on average, 2 meals per day   Current exercise: walking   Current monitoring regimen: home blood tests -Occ    Has brought blood glucose log/meter: yes  Home blood sugar records:one glucose over last 3 months  Any episodes of hypoglycemia? -----  Worsened by high CHO    No Hx of CAD , PVD, CVA     on 2/19      Last eye exam: 2024  Last foot exam: 4/22  Last microalbumin to creatinine ratio:4/22 1/17  C-peptide 0.3  VAUGHN Ab 5.2   ICA -ve      SH: No smoking, works at [employer]    FH: Type 1 and 2 DM in family    PMH:  T1DM    PSH:  None    ROS:    Constitutional: Negative for weight loss and malaise/fatigue. Negative for fever and chills.   HENT: Negative for hearing loss, ear

## 2024-09-14 DIAGNOSIS — E13.9 LADA (LATENT AUTOIMMUNE DIABETES IN ADULTS), MANAGED AS TYPE 1 (HCC): Primary | ICD-10-CM

## 2024-09-16 RX ORDER — INSULIN GLARGINE 100 [IU]/ML
INJECTION, SOLUTION SUBCUTANEOUS
Qty: 15 ADJUSTABLE DOSE PRE-FILLED PEN SYRINGE | Refills: 2 | Status: SHIPPED | OUTPATIENT
Start: 2024-09-16

## 2024-10-28 ENCOUNTER — TELEPHONE (OUTPATIENT)
Dept: ENDOCRINOLOGY | Age: 39
End: 2024-10-28

## 2024-10-28 DIAGNOSIS — E13.9 LADA (LATENT AUTOIMMUNE DIABETES IN ADULTS), MANAGED AS TYPE 1 (HCC): Primary | ICD-10-CM

## 2024-10-28 NOTE — TELEPHONE ENCOUNTER
I have ordered a thyroid blood test which he should have done.   I would also suggest for his symptoms to have evaluation by his PCP for evaluation of other causes.

## 2024-10-28 NOTE — TELEPHONE ENCOUNTER
Pt called in asking what is happening to his body. He feels cold all the time, muscles ache.  He thought it was the flu but it hasn't gone away. This started last week he stated and is wanting to know if you could call him back and advise what he needs to do. Pt Ph [phone]

## 2024-10-29 ENCOUNTER — PATIENT MESSAGE (OUTPATIENT)
Dept: ENDOCRINOLOGY | Age: 39
End: 2024-10-29

## 2024-10-29 DIAGNOSIS — E13.9 LADA (LATENT AUTOIMMUNE DIABETES IN ADULTS), MANAGED AS TYPE 1 (HCC): ICD-10-CM

## 2024-10-29 LAB
T4 FREE SERPL-MCNC: 1 NG/DL (ref 0.9–1.8)
TSH SERPL DL<=0.005 MIU/L-ACNC: 4.53 UIU/ML (ref 0.27–4.2)

## 2024-10-30 ENCOUNTER — TELEPHONE (OUTPATIENT)
Age: 39
End: 2024-10-30

## 2024-10-30 NOTE — TELEPHONE ENCOUNTER
----- Message from Dr. Gibson Castillo MD sent at 10/30/2024  8:46 AM EDT -----  Please advise patient thyroid level slightly low, would need to discuss with him. He will also be due for his 3 month DM f/u, can schedule visit. VV is okay

## 2024-11-08 ENCOUNTER — TELEMEDICINE (OUTPATIENT)
Dept: ENDOCRINOLOGY | Age: 39
End: 2024-11-08

## 2024-11-08 DIAGNOSIS — R79.89 ELEVATED TSH: ICD-10-CM

## 2024-11-08 DIAGNOSIS — E13.9 LADA (LATENT AUTOIMMUNE DIABETES IN ADULTS), MANAGED AS TYPE 1 (HCC): Primary | ICD-10-CM

## 2024-11-08 PROCEDURE — 99214 OFFICE O/P EST MOD 30 MIN: CPT | Performed by: INTERNAL MEDICINE

## 2024-11-08 NOTE — PROGRESS NOTES
Seen as patient for diabetes      Patient was evaluated through a synchronous (real-time) audio-video encounter. The patient (or guardian if applicable) is aware that this is a billable service, which includes applicable co-pays. This Virtual Visit was conducted with patient's (and/or legal guardian's) consent. Patient identification was verified, and a caregiver was present when appropriate.   [place]   [place]    STOP! Confirm you are appropriately licensed, registered, or certified to deliver care in the state where the patient is located as indicated above. If you are not or unsure, please re-schedule the visit.    Are you appropriately licensed in the patient's state?: Yes      Interim:    He had fatigue  Cold intolerance  Forgot meter    Diagnosed with Type 1 diabetes mellitus 2017  Known diabetic complications: none   Uncontrolled, moderate    Glucose 622 on admission  Mild DKA per records/labs    Records reviewed    He was being seen at , not adherent with visit  Now insurance changed    Current diabetic medications     Lantus  15 units    Novolog  12 units AC TID-     Last A1c   8.3% --->   >14%---> 10.5%    Relative hypoglycemia in 100s    Prior visit with dietician:no   Current diet: on average, 2 meals per day   Current exercise: walking   Current monitoring regimen: home blood tests -Occ    Has brought blood glucose log/meter: yes  Home blood sugar records:100s per patient  Any episodes of hypoglycemia? -----  Worsened by high CHO    No Hx of CAD , PVD, CVA     on 2/19      Last eye exam: 2024  Last foot exam: 4/22  Last microalbumin to creatinine ratio:4/22 1/17  C-peptide 0.3  VAUGHN Ab 5.2   ICA -ve    His TSH is elevated    10/24 TSH 4.53    No h/o thyroid problems    SH: No smoking, works at [employer]    FH: Type 1 and 2 DM in family    PMH:  T1DM    PSH:  None    ROS:    Constitutional: Negative for weight loss and malaise/fatigue.

## 2025-05-05 DIAGNOSIS — E13.9 LADA (LATENT AUTOIMMUNE DIABETES IN ADULTS), MANAGED AS TYPE 1 (HCC): ICD-10-CM

## 2025-05-05 RX ORDER — INSULIN GLARGINE 100 [IU]/ML
INJECTION, SOLUTION SUBCUTANEOUS
Qty: 3 ADJUSTABLE DOSE PRE-FILLED PEN SYRINGE | Refills: 0 | Status: SHIPPED | OUTPATIENT
Start: 2025-05-05

## 2025-05-05 NOTE — TELEPHONE ENCOUNTER
Medication:   Requested Prescriptions     Pending Prescriptions Disp Refills    insulin glargine (LANTUS SOLOSTAR) 100 UNIT/ML injection pen [Pharmacy Med Name: LANTUS SOLOSTAR 100 UNIT/ML]       Sig: INJECT 18 UNITS DAILY       Last Filled:      Patient Phone Number: [phone] (home)     Last appt: 11/8/2024   Next appt: Visit date not found    Last Labs DM:   Lab Results   Component Value Date/Time    LABA1C 10.5 02/01/2023 02:36 PM

## 2025-05-15 ENCOUNTER — TELEPHONE (OUTPATIENT)
Dept: ENDOCRINOLOGY | Age: 40
End: 2025-05-15

## 2025-06-08 DIAGNOSIS — R73.9 HYPERGLYCEMIA: ICD-10-CM

## 2025-06-08 DIAGNOSIS — Z79.4 INSULIN LONG-TERM USE (HCC): ICD-10-CM

## 2025-06-08 DIAGNOSIS — E13.9 LADA (LATENT AUTOIMMUNE DIABETES IN ADULTS), MANAGED AS TYPE 1 (HCC): ICD-10-CM

## 2025-06-09 RX ORDER — INSULIN ASPART 100 [IU]/ML
INJECTION, SOLUTION INTRAVENOUS; SUBCUTANEOUS
Qty: 45 ML | Refills: 3 | Status: SHIPPED | OUTPATIENT
Start: 2025-06-09

## 2025-06-09 NOTE — TELEPHONE ENCOUNTER
Medication:   Requested Prescriptions     Pending Prescriptions Disp Refills    insulin aspart (NOVOLOG FLEXPEN) 100 UNIT/ML injection pen [Pharmacy Med Name: NOVOLOG 100 UNIT/ML FLEXPEN]  3     Sig: INJECT 10-12 UNITS THREE TIMES DAILY BEFORE MEALS       Last Filled:      Patient Phone Number: [phone] (home)     Last appt: 11/8/2024   Next appt: Visit date not found    Last Labs DM:   Lab Results   Component Value Date/Time    LABA1C 10.5 02/01/2023 02:36 PM

## 2025-06-27 DIAGNOSIS — E13.9 LADA (LATENT AUTOIMMUNE DIABETES IN ADULTS), MANAGED AS TYPE 1 (HCC): ICD-10-CM

## 2025-06-30 RX ORDER — INSULIN GLARGINE 100 [IU]/ML
INJECTION, SOLUTION SUBCUTANEOUS
OUTPATIENT
Start: 2025-06-30

## 2025-07-08 ENCOUNTER — OFFICE VISIT (OUTPATIENT)
Dept: ENDOCRINOLOGY | Age: 40
End: 2025-07-08
Payer: COMMERCIAL

## 2025-07-08 VITALS
HEART RATE: 80 BPM | OXYGEN SATURATION: 97 % | BODY MASS INDEX: 29.48 KG/M2 | SYSTOLIC BLOOD PRESSURE: 131 MMHG | DIASTOLIC BLOOD PRESSURE: 86 MMHG | WEIGHT: 156 LBS

## 2025-07-08 DIAGNOSIS — E13.9 LADA (LATENT AUTOIMMUNE DIABETES IN ADULTS), MANAGED AS TYPE 1 (HCC): ICD-10-CM

## 2025-07-08 DIAGNOSIS — Z79.4 INSULIN LONG-TERM USE (HCC): ICD-10-CM

## 2025-07-08 PROCEDURE — 83036 HEMOGLOBIN GLYCOSYLATED A1C: CPT | Performed by: INTERNAL MEDICINE

## 2025-07-08 PROCEDURE — 99214 OFFICE O/P EST MOD 30 MIN: CPT | Performed by: INTERNAL MEDICINE

## 2025-07-08 RX ORDER — INSULIN ASPART 100 [IU]/ML
INJECTION, SOLUTION INTRAVENOUS; SUBCUTANEOUS
Qty: 45 ML | Refills: 3 | Status: SHIPPED | OUTPATIENT
Start: 2025-07-08

## 2025-07-08 RX ORDER — INSULIN GLARGINE 100 [IU]/ML
INJECTION, SOLUTION SUBCUTANEOUS
Qty: 5 ADJUSTABLE DOSE PRE-FILLED PEN SYRINGE | Refills: 3 | Status: SHIPPED | OUTPATIENT
Start: 2025-07-08